# Patient Record
Sex: FEMALE | Race: WHITE | NOT HISPANIC OR LATINO | URBAN - METROPOLITAN AREA
[De-identification: names, ages, dates, MRNs, and addresses within clinical notes are randomized per-mention and may not be internally consistent; named-entity substitution may affect disease eponyms.]

---

## 2021-02-25 ENCOUNTER — OFFICE VISIT (OUTPATIENT)
Dept: GASTROENTEROLOGY | Facility: CLINIC | Age: 21
End: 2021-02-25

## 2021-02-25 VITALS — TEMPERATURE: 94.8 F | BODY MASS INDEX: 21.65 KG/M2 | HEIGHT: 59 IN | WEIGHT: 107.4 LBS

## 2021-02-25 DIAGNOSIS — R10.13 EPIGASTRIC PAIN: Primary | ICD-10-CM

## 2021-02-25 PROCEDURE — 99204 OFFICE O/P NEW MOD 45 MIN: CPT | Performed by: INTERNAL MEDICINE

## 2021-02-25 RX ORDER — OMEPRAZOLE 20 MG/1
20 CAPSULE, DELAYED RELEASE ORAL DAILY
Qty: 90 CAPSULE | Refills: 3 | Status: SHIPPED | OUTPATIENT
Start: 2021-02-25 | End: 2021-03-10 | Stop reason: SINTOL

## 2021-02-25 RX ORDER — MULTIPLE VITAMINS W/ MINERALS TAB 9MG-400MCG
1 TAB ORAL DAILY
COMMUNITY

## 2021-02-25 RX ORDER — HYOSCYAMINE SULFATE 0.125 MG
125 TABLET,DISINTEGRATING ORAL EVERY 4 HOURS PRN
Qty: 120 TABLET | Refills: 11 | Status: SHIPPED | OUTPATIENT
Start: 2021-02-25

## 2021-02-25 RX ORDER — LEVONORGESTREL 13.5 MG/1
1 INTRAUTERINE DEVICE INTRAUTERINE ONCE
COMMUNITY

## 2021-02-25 NOTE — PROGRESS NOTES
Chief Complaint   Patient presents with   • Abdominal Pain   • Nausea     Joy Heller is a 20 y.o. female who presents with a history of recurrent epigastric pain  HPI     20-year-old female with history of reflux presenting with recurrent epigastric pain.  Pain feels like a pressure can be severe at times with cramping all in the upper epigastric area.  Patient denies any vomiting but had some nausea.  Patient denies any melena or bright red blood per rectum or hematemesis.  Symptoms seem set different than the reflux she has had in the past.    Past Medical History:   Diagnosis Date   • Ruptured ovarian cyst        Current Outpatient Medications:   •  Levonorgestrel (Amparo) 13.5 MG intrauterine device IUD, 1 each by Intrauterine route 1 (One) Time., Disp: , Rfl:   •  multivitamin with minerals (MULTIVITAMIN ADULTS PO), Take 1 tablet by mouth Daily., Disp: , Rfl:   •  hyoscyamine sulfate (ANASPAZ) 0.125 MG tablet dispersible disintegrating tablet, Place 1 tablet on the tongue Every 4 (Four) Hours As Needed (Cramps)., Disp: 120 tablet, Rfl: 11  •  omeprazole (priLOSEC) 20 MG capsule, Take 1 capsule by mouth Daily., Disp: 90 capsule, Rfl: 3  Allergies   Allergen Reactions   • Codeine    • Sulfa Antibiotics      Social History     Socioeconomic History   • Marital status: Single     Spouse name: Not on file   • Number of children: Not on file   • Years of education: Not on file   • Highest education level: Not on file   Tobacco Use   • Smoking status: Never Smoker   • Smokeless tobacco: Never Used   Substance and Sexual Activity   • Alcohol use: Yes     Comment: occ   • Drug use: Never     Family History   Problem Relation Age of Onset   • Hiatal hernia Maternal Grandfather    • Hiatal hernia Maternal Great-Grandmother      Review of Systems   Constitutional: Negative.    HENT: Negative.    Respiratory: Negative.    Cardiovascular: Negative.    Gastrointestinal: Positive for abdominal pain and nausea. Negative for  abdominal distention, anal bleeding, blood in stool, constipation, diarrhea, rectal pain and vomiting.   Endocrine: Negative.    Musculoskeletal: Negative.    Skin: Negative.    Allergic/Immunologic: Negative.    Hematological: Negative.    Psychiatric/Behavioral: Negative.      Vitals:    02/25/21 1555   Temp: 94.8 °F (34.9 °C)     Physical Exam  Vitals signs and nursing note reviewed.   Constitutional:       Appearance: Normal appearance. She is well-developed and normal weight.   HENT:      Head: Normocephalic and atraumatic.   Eyes:      General: No scleral icterus.     Pupils: Pupils are equal, round, and reactive to light.   Neck:      Musculoskeletal: Normal range of motion and neck supple. No neck rigidity.   Cardiovascular:      Rate and Rhythm: Normal rate and regular rhythm.      Heart sounds: Normal heart sounds. No murmur. No friction rub. No gallop.    Pulmonary:      Effort: Pulmonary effort is normal.      Breath sounds: Normal breath sounds. No wheezing.   Abdominal:      General: Bowel sounds are normal. There is no distension or abdominal bruit.      Palpations: Abdomen is soft. Abdomen is not rigid. There is no shifting dullness, fluid wave, mass or pulsatile mass.      Tenderness: There is no abdominal tenderness. There is no guarding.      Hernia: No hernia is present.   Musculoskeletal: Normal range of motion.         General: No swelling or tenderness.   Skin:     General: Skin is warm and dry.      Coloration: Skin is not jaundiced.      Findings: No rash.   Neurological:      General: No focal deficit present.      Mental Status: She is alert and oriented to person, place, and time.      Cranial Nerves: No cranial nerve deficit.   Psychiatric:         Behavior: Behavior normal.         Thought Content: Thought content normal.       Diagnoses and all orders for this visit:    Epigastric pain  -     FL Upper GI Single Contrast With KUB; Future    Other orders  -     Levonorgestrel (Amparo)  13.5 MG intrauterine device IUD; 1 each by Intrauterine route 1 (One) Time.  -     multivitamin with minerals (MULTIVITAMIN ADULTS PO); Take 1 tablet by mouth Daily.  -     omeprazole (priLOSEC) 20 MG capsule; Take 1 capsule by mouth Daily.  -     hyoscyamine sulfate (ANASPAZ) 0.125 MG tablet dispersible disintegrating tablet; Place 1 tablet on the tongue Every 4 (Four) Hours As Needed (Cramps).    Patient 20-year-old female with history of reflux and dyspepsia in the past endoscopically evaluated with mild gastritis and a small hiatal hernia in the past.  Patient complaining of epigastric pain and feeling of the food being squeezed through a straw inside her.  Patient denies any nausea vomiting no melena or bright red blood per rectum.  Patient did have an episode of severe crampy epigastric pain that occurred a couple of nights in the last month.  For now will begin omeprazole 20 mg daily and arrange an upper GI series.  We will also give Levsin as needed for cramps.  We will follow-up once x-ray done

## 2021-03-04 ENCOUNTER — TRANSCRIBE ORDERS (OUTPATIENT)
Dept: LAB | Facility: HOSPITAL | Age: 21
End: 2021-03-04

## 2021-03-04 DIAGNOSIS — Z01.818 OTHER SPECIFIED PRE-OPERATIVE EXAMINATION: Primary | ICD-10-CM

## 2021-03-10 ENCOUNTER — TELEPHONE (OUTPATIENT)
Dept: GASTROENTEROLOGY | Facility: CLINIC | Age: 21
End: 2021-03-10

## 2021-03-10 RX ORDER — LANSOPRAZOLE 30 MG/1
30 CAPSULE, DELAYED RELEASE ORAL DAILY
Qty: 90 CAPSULE | Refills: 3 | Status: SHIPPED | OUTPATIENT
Start: 2021-03-10

## 2021-03-10 NOTE — TELEPHONE ENCOUNTER
Will change PPI to a different brand but according to computer formulary her insurance will cover no other.  Will attempt to get prior authorization in the setting that she has adverse reaction to Prilosec.

## 2021-03-10 NOTE — TELEPHONE ENCOUNTER
----- Message from Jeane Savage sent at 3/10/2021 11:57 AM EST -----  Regarding: omeprazole (priLOSEC) 20 MG capsule  Contact: [phone]  PT has questions about taking omeprazole (priLOSEC) 20 MG capsule , please advise

## 2021-03-10 NOTE — TELEPHONE ENCOUNTER
Called pt and she states she has been bloated and feels constipated since taking omeprazole.  Advised would send a msg to Dr Garcia.    Msg sent to Dr Garcia.

## 2021-03-20 ENCOUNTER — LAB (OUTPATIENT)
Dept: LAB | Facility: HOSPITAL | Age: 21
End: 2021-03-20

## 2021-03-20 DIAGNOSIS — Z01.818 OTHER SPECIFIED PRE-OPERATIVE EXAMINATION: ICD-10-CM

## 2021-03-20 PROCEDURE — U0004 COV-19 TEST NON-CDC HGH THRU: HCPCS

## 2021-03-20 PROCEDURE — C9803 HOPD COVID-19 SPEC COLLECT: HCPCS

## 2021-03-22 LAB — SARS-COV-2 RNA RESP QL NAA+PROBE: NOT DETECTED

## 2021-03-23 ENCOUNTER — HOSPITAL ENCOUNTER (OUTPATIENT)
Dept: GENERAL RADIOLOGY | Facility: HOSPITAL | Age: 21
Discharge: HOME | End: 2021-03-23
Admitting: INTERNAL MEDICINE

## 2021-03-23 DIAGNOSIS — R10.13 EPIGASTRIC PAIN: ICD-10-CM

## 2021-03-23 PROCEDURE — 74246 X-RAY XM UPR GI TRC 2CNTRST: CPT

## 2021-03-23 RX ADMIN — BARIUM SULFATE 135 ML: 980 POWDER, FOR SUSPENSION ORAL at 08:36

## 2021-03-23 RX ADMIN — ANTACID/ANTIFLATULENT 1 TABLET: 380; 550; 10; 10 GRANULE, EFFERVESCENT ORAL at 08:36

## 2021-03-31 ENCOUNTER — TELEPHONE (OUTPATIENT)
Dept: GASTROENTEROLOGY | Facility: CLINIC | Age: 21
End: 2021-03-31

## 2021-03-31 DIAGNOSIS — R10.13 EPIGASTRIC PAIN: Primary | ICD-10-CM

## 2021-03-31 NOTE — TELEPHONE ENCOUNTER
Per Dr. Garcia: Upper GI series with no evidence of ulcer or significant reflux noted.  Some possible gastritis was noted.  Please arrange stool for H. pylori to evaluate.

## 2021-03-31 NOTE — TELEPHONE ENCOUNTER
----- Message from Jeane Camacho sent at 3/31/2021 10:25 AM EDT -----  Regarding: upper gi results  Contact: [phone]  Pt left a message regarding her results.

## 2021-03-31 NOTE — TELEPHONE ENCOUNTER
Patient called. Advised as per Dr. Garcia's note. She verb understanding. Stool kit left at  desk for /

## 2021-05-10 ENCOUNTER — TELEPHONE (OUTPATIENT)
Dept: GASTROENTEROLOGY | Facility: CLINIC | Age: 21
End: 2021-05-10

## 2021-05-10 NOTE — TELEPHONE ENCOUNTER
----- Message from Jeane Savage sent at 5/10/2021  9:58 AM EDT -----  Regarding: results  Contact: [phone]  PT is calling back for results

## 2021-05-10 NOTE — TELEPHONE ENCOUNTER
Returned patient's phone call. Requesting H. Pylori stool study test results. Advised they were negative. She questions if there is anything else she needs to do. When asked if her medication was working. She states most of the time. She had to switch from Omeprazole to Lansoprazole 15 mg daily because of a reaction to the Omeprazole.   States she has not had to take the Levsin often, because the cramping is not that bad.   States she notices cramping and loose stools after she eats greens and mushrooms. Advised patient to avoid these foods. She verb understanding. Advised will send an update to Dr. Garcia.

## 2023-04-27 ENCOUNTER — OFFICE VISIT (OUTPATIENT)
Dept: GASTROENTEROLOGY | Facility: CLINIC | Age: 23
End: 2023-04-27
Payer: COMMERCIAL

## 2023-04-27 ENCOUNTER — TELEPHONE (OUTPATIENT)
Dept: GASTROENTEROLOGY | Facility: CLINIC | Age: 23
End: 2023-04-27
Payer: COMMERCIAL

## 2023-04-27 VITALS
HEART RATE: 67 BPM | TEMPERATURE: 97.2 F | HEIGHT: 59 IN | BODY MASS INDEX: 22.34 KG/M2 | OXYGEN SATURATION: 98 % | WEIGHT: 110.8 LBS

## 2023-04-27 DIAGNOSIS — R93.3 ABNORMAL FINDING ON GI TRACT IMAGING: ICD-10-CM

## 2023-04-27 DIAGNOSIS — R11.11 VOMITING WITHOUT NAUSEA, UNSPECIFIED VOMITING TYPE: ICD-10-CM

## 2023-04-27 DIAGNOSIS — R10.13 EPIGASTRIC PAIN: Primary | ICD-10-CM

## 2023-04-27 LAB
ALBUMIN SERPL-MCNC: 4.8 G/DL (ref 3.5–5.2)
ALBUMIN/GLOB SERPL: 2.2 G/DL
ALP SERPL-CCNC: 44 U/L (ref 39–117)
ALT SERPL-CCNC: 34 U/L (ref 1–33)
AST SERPL-CCNC: 28 U/L (ref 1–32)
BASOPHILS # BLD AUTO: 0.09 10*3/MM3 (ref 0–0.2)
BASOPHILS NFR BLD AUTO: 1.2 % (ref 0–1.5)
BILIRUB SERPL-MCNC: 0.4 MG/DL (ref 0–1.2)
BUN SERPL-MCNC: 10 MG/DL (ref 6–20)
BUN/CREAT SERPL: 13.2 (ref 7–25)
CALCIUM SERPL-MCNC: 10.1 MG/DL (ref 8.6–10.5)
CHLORIDE SERPL-SCNC: 103 MMOL/L (ref 98–107)
CO2 SERPL-SCNC: 27.3 MMOL/L (ref 22–29)
CREAT SERPL-MCNC: 0.76 MG/DL (ref 0.57–1)
EGFRCR SERPLBLD CKD-EPI 2021: 112.4 ML/MIN/1.73
EOSINOPHIL # BLD AUTO: 0.39 10*3/MM3 (ref 0–0.4)
EOSINOPHIL NFR BLD AUTO: 5.3 % (ref 0.3–6.2)
ERYTHROCYTE [DISTWIDTH] IN BLOOD BY AUTOMATED COUNT: 11.9 % (ref 12.3–15.4)
GLOBULIN SER CALC-MCNC: 2.2 GM/DL
GLUCOSE SERPL-MCNC: 86 MG/DL (ref 65–99)
HCT VFR BLD AUTO: 41.2 % (ref 34–46.6)
HGB BLD-MCNC: 14 G/DL (ref 12–15.9)
IMM GRANULOCYTES # BLD AUTO: 0.02 10*3/MM3 (ref 0–0.05)
IMM GRANULOCYTES NFR BLD AUTO: 0.3 % (ref 0–0.5)
LYMPHOCYTES # BLD AUTO: 2.62 10*3/MM3 (ref 0.7–3.1)
LYMPHOCYTES NFR BLD AUTO: 35.4 % (ref 19.6–45.3)
MCH RBC QN AUTO: 30.2 PG (ref 26.6–33)
MCHC RBC AUTO-ENTMCNC: 34 G/DL (ref 31.5–35.7)
MCV RBC AUTO: 89 FL (ref 79–97)
MONOCYTES # BLD AUTO: 0.54 10*3/MM3 (ref 0.1–0.9)
MONOCYTES NFR BLD AUTO: 7.3 % (ref 5–12)
NEUTROPHILS # BLD AUTO: 3.75 10*3/MM3 (ref 1.7–7)
NEUTROPHILS NFR BLD AUTO: 50.5 % (ref 42.7–76)
NRBC BLD AUTO-RTO: 0 /100 WBC (ref 0–0.2)
PLATELET # BLD AUTO: 171 10*3/MM3 (ref 140–450)
POTASSIUM SERPL-SCNC: 4.3 MMOL/L (ref 3.5–5.2)
PROT SERPL-MCNC: 7 G/DL (ref 6–8.5)
RBC # BLD AUTO: 4.63 10*6/MM3 (ref 3.77–5.28)
SODIUM SERPL-SCNC: 138 MMOL/L (ref 136–145)
WBC # BLD AUTO: 7.41 10*3/MM3 (ref 3.4–10.8)

## 2023-04-27 PROCEDURE — 99214 OFFICE O/P EST MOD 30 MIN: CPT | Performed by: NURSE PRACTITIONER

## 2023-04-27 RX ORDER — PRENATAL VIT/IRON FUM/FOLIC AC 27MG-0.8MG
TABLET ORAL DAILY
COMMUNITY

## 2023-04-27 NOTE — PROGRESS NOTES
Chief Complaint   Patient presents with   • Abdominal Pain       HPI    Joy FIGUEROA is a  22 y.o. female here with history of lap eran for a follow up visit for abdominal pain.    This patient follows Dr. Garcia, new to me.    Reports approximately 2 years of abdominal pain localizes to the epigastric area.  She feels better if she induces vomiting.  Pain worsens with eating.  She tried omeprazole, lansoprazole and Levsin previously with no change in symptoms.  She feels like food is slow to digest.  Reports excessive gas and bloating.  Symptoms worse with mushrooms and steak.  She is concerned about possible SIBO.    Occasional episodes of diarrhea otherwise bowel movements are regular.  Denies melena or bright red blood per rectum.  No lower abdominal pain or rectal pain.    Reviewed upper GI series 3/2021 -trace GERD while supine.  Irregular rugal folds suggestive of gastritis.    Past Medical History:   Diagnosis Date   • COVID-19 09/30/2021   • GERD (gastroesophageal reflux disease)    • Ruptured ovarian cyst        Past Surgical History:   Procedure Laterality Date   • CHOLECYSTECTOMY     • KNEE ACL RECONSTRUCTION      x2 Left   • UPPER GASTROINTESTINAL ENDOSCOPY  11/13/2015    Dr. Garcia    • WISDOM TOOTH EXTRACTION         Scheduled Meds:     Continuous Infusions: No current facility-administered medications for this visit.      PRN Meds:     Allergies   Allergen Reactions   • Omeprazole Magnesium Nausea Only   • Codeine    • Sulfa Antibiotics        Social History     Socioeconomic History   • Marital status:    Tobacco Use   • Smoking status: Never   • Smokeless tobacco: Never   Substance and Sexual Activity   • Alcohol use: Yes     Comment: occ   • Drug use: Never       Family History   Problem Relation Age of Onset   • Hiatal hernia Maternal Grandfather    • Hiatal hernia Maternal Great-Grandmother        Review of Systems   Constitutional: Negative for activity change, appetite change,  fatigue, fever and unexpected weight change.   HENT: Negative for trouble swallowing.    Respiratory: Negative for apnea, cough, choking, chest tightness, shortness of breath and wheezing.    Cardiovascular: Negative for chest pain, palpitations and leg swelling.   Gastrointestinal: Positive for abdominal pain. Negative for abdominal distention, anal bleeding, blood in stool, constipation, diarrhea, nausea, rectal pain and vomiting.       Vitals:    04/27/23 1048   Pulse: 67   Temp: 97.2 °F (36.2 °C)   SpO2: 98%       Physical Exam  Constitutional:       Appearance: She is well-developed.   Abdominal:      General: Bowel sounds are normal. There is no distension.      Palpations: Abdomen is soft. There is no mass.      Tenderness: There is abdominal tenderness. There is no guarding.      Hernia: No hernia is present.   Skin:     General: Skin is warm and dry.      Capillary Refill: Capillary refill takes less than 2 seconds.   Neurological:      Mental Status: She is alert and oriented to person, place, and time.   Psychiatric:         Behavior: Behavior normal.     Assessment    Diagnoses and all orders for this visit:    1. Epigastric pain (Primary)  -     Case Request; Standing  -     Obtain Informed Consent; Standing  -     Case Request  -     CBC & Differential  -     Comprehensive Metabolic Panel  -     Food Allergy Profile  -     Celiac Comprehensive Panel    2. Vomiting without nausea, unspecified vomiting type  Overview:  Added automatically from request for surgery [id number]    Orders:  -     Case Request; Standing  -     Obtain Informed Consent; Standing  -     Case Request  -     CBC & Differential  -     Comprehensive Metabolic Panel  -     Food Allergy Profile  -     Celiac Comprehensive Panel    3. Abnormal finding on GI tract imaging  Overview:  Added automatically from request for surgery [id number]    Orders:  -     Case Request; Standing  -     Obtain Informed Consent; Standing  -     Case Request      Plan    Recommend EGD with Dr. Garcia for further evaluation of the above-mentioned symptoms  We discussed resumption of PPI therapy but she prefers to wait and see what endoscopic examination reveals  Labs today as above  Patient interested in SIBO testing pending EGD results  Follow-up and further recommendations pending the aforementioned work-up         TYLOR Murphy  [facility]  [address] [place] [address]  Office: [phone]

## 2023-04-29 LAB
ENDOMYSIUM IGA SER QL: NEGATIVE
GLIADIN PEPTIDE IGA SER-ACNC: 5 UNITS (ref 0–19)
GLIADIN PEPTIDE IGG SER-ACNC: 4 UNITS (ref 0–19)
IGA SERPL-MCNC: 179 MG/DL (ref 87–352)
TTG IGA SER-ACNC: <2 U/ML (ref 0–3)
TTG IGG SER-ACNC: <2 U/ML (ref 0–5)

## 2023-05-01 ENCOUNTER — TELEPHONE (OUTPATIENT)
Dept: GASTROENTEROLOGY | Facility: CLINIC | Age: 23
End: 2023-05-01
Payer: COMMERCIAL

## 2023-05-01 LAB
CLAM IGE QN: <0.1 KU/L
CODFISH IGE QN: <0.1 KU/L
CONV CLASS DESCRIPTION: ABNORMAL
CORN IGE QN: <0.1 KU/L
COW MILK IGE QN: <0.1 KU/L
EGG WHITE IGE QN: 0.45 KU/L
PEANUT IGE QN: <0.1 KU/L
SCALLOP IGE QN: <0.1 KU/L
SESAME SEED IGE QN: <0.1 KU/L
SHRIMP IGE QN: <0.1 KU/L
SOYBEAN IGE QN: <0.1 KU/L
WALNUT IGE QN: <0.1 KU/L
WHEAT IGE QN: <0.1 KU/L

## 2023-05-01 NOTE — PROGRESS NOTES
Please inform the patient lab work shows a low sensitivity to egg white consider avoidance.  Await endoscopic findings